# Patient Record
Sex: FEMALE | Race: BLACK OR AFRICAN AMERICAN | Employment: FULL TIME | URBAN - METROPOLITAN AREA
[De-identification: names, ages, dates, MRNs, and addresses within clinical notes are randomized per-mention and may not be internally consistent; named-entity substitution may affect disease eponyms.]

---

## 2022-03-09 RX ORDER — HYDROXYCHLOROQUINE SULFATE 200 MG/1
400 TABLET, FILM COATED ORAL EVERY EVENING
COMMUNITY
Start: 2021-12-14

## 2022-03-09 RX ORDER — HYDROCHLOROTHIAZIDE 25 MG/1
25 TABLET ORAL DAILY
COMMUNITY

## 2022-03-09 RX ORDER — BUPROPION HYDROCHLORIDE 150 MG/1
150 TABLET ORAL DAILY
COMMUNITY
Start: 2021-11-18

## 2022-03-09 RX ORDER — ACETAMINOPHEN 325 MG/1
975 TABLET ORAL EVERY 8 HOURS
COMMUNITY
Start: 2022-02-16

## 2022-03-09 RX ORDER — POLYETHYLENE GLYCOL 3350 17 G/17G
17 POWDER, FOR SOLUTION ORAL DAILY
COMMUNITY
Start: 2022-02-16 | End: 2022-03-18

## 2022-03-09 RX ORDER — IBUPROFEN 600 MG/1
600 TABLET ORAL EVERY 8 HOURS
COMMUNITY
Start: 2022-02-16

## 2022-03-09 NOTE — PROGRESS NOTES
Covid testing to be done @  If positive---Pt instructed to notify MD ASAP   If negative--pt was instructed to bring results DOP/DOSPreoperative Screening for Elective Surgery/Invasive Procedures While COVID-19 present in the community     1. Have you tested positive or have been told to self-isolate for COVID-19 like symptoms within the past 28 days?no  2. Do you currently have any of the following symptoms?no  ? Fever >100.0 F or 99.9 F in immunocompromised patients? ? New onset cough, shortness of breath or difficulty breathing? ? New onset sore throat, myalgia (muscle aches and pains), headache, loss of taste/smell or diarrhea? 3. Have you had a potential exposure to COVID-19 within the past 14 days by:no  ? Close contact with a confirmed case? ? Close contact with a healthcare worker,  or essential infrastructure worker (grocery store, [employer], gas station, public utilities or transportation)?no  ? Do you reside in a congregate setting such as; skilled nursing facility, adult home, correctional facility, homeless shelter or other institutional setting? ? Have you had recent travel to a known COVID-19 hotspot? Indicate if the patient has a positive screen by answering yes to one or more of the above questions. [address]  time____________        Surgery time____1530________  1400  Take the following medications with a sip of water:    Do not eat or drink anything after 12:00 midnight prior to your surgery. This includes water chewing gum, mints and ice chips. You may brush your teeth and gargle the morning of your surgery, but do not swallow the water     Please see your family doctor/pediatrician for a history and physical and/or concerning medications. Bring any test results/reports from your physicians office.    If you are under the care of a heart doctor or specialist doctor, please be aware that you may be asked to them for clearance    You may be asked to stop blood thinners such as Coumadin, Plavix, Fragmin, Lovenox, etc., or any anti-inflammatories such as:  Aspirin, Ibuprofen, Advil, Naproxen prior to your surgery. We also ask that you stop any OTC medications such as fish oil, vitamin E, glucosamine, garlic, Multivitamins, COQ 10, etc.    We ask that you do not smoke 24 hours prior to surgery  We ask that you do not  drink any alcoholic beverages 24 hours prior to surgery     You must make arrangements for a responsible adult to take you home after your surgery. For your safety you will not be allowed to leave alone or drive yourself home. Your surgery will be cancelled if you do not have a ride home. Also for your safety, it is strongly suggested that someone stay with you the first 24 hours after your surgery. A parent or legal guardian must accompany a child scheduled for surgery and plan to stay at the hospital until the child is discharged. Please do not bring other children with you. For your comfort, please wear simple loose fitting clothing to the hospital.  Please do not bring valuables. Do not wear any make-up or nail polish on your fingers or toes      For your safety, please do not wear any jewelry or body piercing's on the day of surgery. All jewelry must be removed. If you have dentures, they will be removed before going to operating room. For your convenience, we will provide you with a container. If you wear contact lenses or glasses, they will be removed, please bring a case for them. If you have a living will and a durable power of  for healthcare, please bring in a copy. As part of our patient safety program to minimize surgical site infections, we ask you to do the following:    · Please notify your surgeon if you develop any illness between         now and the  day of your surgery.     · This includes a cough, cold, fever, sore throat, nausea,         or vomiting, and diarrhea, etc.  ·  Please notify your surgeon if you experience dizziness, shortness         of breath or blurred vision between now and the time of your surgery. Do not shave your operative site 96 hours prior to surgery. For face and neck surgery, men may use an electric razor 48 hours   prior to surgery. You may shower the night before surgery or the morning of   your surgery with an antibacterial soap. You will need to bring a photo ID and insurance card    [facility] has an onsite pharmacy, would you like to utilize our pharmacy     If you will be staying overnight and use a C-pap machine, please bring   your C-pap to hospital     Our goal is to provide you with excellent care, therefore, visitors will be limited to two(2) in the room at a time so that we may focus on providing this care for you. Please contact pre-admission testing if you have any further questions. [facility] phone number:  [phone] PAT fax number:  [phone]  Please note these are generalized instructions for all surgical cases, you may be provided with more specific instructions according to your surgery.

## 2022-03-15 ENCOUNTER — ANESTHESIA EVENT (OUTPATIENT)
Dept: ENDOSCOPY | Age: 47
End: 2022-03-15
Payer: COMMERCIAL

## 2022-03-16 ENCOUNTER — HOSPITAL ENCOUNTER (OUTPATIENT)
Age: 47
Setting detail: OUTPATIENT SURGERY
Discharge: HOME | End: 2022-03-16
Attending: INTERNAL MEDICINE | Admitting: INTERNAL MEDICINE
Payer: COMMERCIAL

## 2022-03-16 ENCOUNTER — ANESTHESIA (OUTPATIENT)
Dept: ENDOSCOPY | Age: 47
End: 2022-03-16
Payer: COMMERCIAL

## 2022-03-16 VITALS
SYSTOLIC BLOOD PRESSURE: 143 MMHG | RESPIRATION RATE: 18 BRPM | HEART RATE: 75 BPM | WEIGHT: 258.71 LBS | OXYGEN SATURATION: 99 % | TEMPERATURE: 97.3 F | BODY MASS INDEX: 41.58 KG/M2 | HEIGHT: 66 IN | DIASTOLIC BLOOD PRESSURE: 83 MMHG

## 2022-03-16 VITALS — SYSTOLIC BLOOD PRESSURE: 103 MMHG | DIASTOLIC BLOOD PRESSURE: 50 MMHG | OXYGEN SATURATION: 100 % | TEMPERATURE: 98.6 F

## 2022-03-16 PROCEDURE — 3700000001 HC ADD 15 MINUTES (ANESTHESIA): Performed by: INTERNAL MEDICINE

## 2022-03-16 PROCEDURE — 88305 TISSUE EXAM BY PATHOLOGIST: CPT

## 2022-03-16 PROCEDURE — 88342 IMHCHEM/IMCYTCHM 1ST ANTB: CPT

## 2022-03-16 PROCEDURE — 2500000003 HC RX 250 WO HCPCS

## 2022-03-16 PROCEDURE — 2709999900 HC NON-CHARGEABLE SUPPLY: Performed by: INTERNAL MEDICINE

## 2022-03-16 PROCEDURE — 6360000002 HC RX W HCPCS

## 2022-03-16 PROCEDURE — 7100000000 HC PACU RECOVERY - FIRST 15 MIN: Performed by: INTERNAL MEDICINE

## 2022-03-16 PROCEDURE — 3700000000 HC ANESTHESIA ATTENDED CARE: Performed by: INTERNAL MEDICINE

## 2022-03-16 PROCEDURE — 88341 IMHCHEM/IMCYTCHM EA ADD ANTB: CPT

## 2022-03-16 PROCEDURE — 88172 CYTP DX EVAL FNA 1ST EA SITE: CPT

## 2022-03-16 PROCEDURE — 2580000003 HC RX 258: Performed by: ANESTHESIOLOGY

## 2022-03-16 PROCEDURE — 7100000010 HC PHASE II RECOVERY - FIRST 15 MIN: Performed by: INTERNAL MEDICINE

## 2022-03-16 PROCEDURE — 7100000001 HC PACU RECOVERY - ADDTL 15 MIN: Performed by: INTERNAL MEDICINE

## 2022-03-16 PROCEDURE — 2720000010 HC SURG SUPPLY STERILE: Performed by: INTERNAL MEDICINE

## 2022-03-16 PROCEDURE — 7100000011 HC PHASE II RECOVERY - ADDTL 15 MIN: Performed by: INTERNAL MEDICINE

## 2022-03-16 PROCEDURE — 3609020100 HC COLONOSCOPY W/EUS FNA: Performed by: INTERNAL MEDICINE

## 2022-03-16 PROCEDURE — 88173 CYTOPATH EVAL FNA REPORT: CPT

## 2022-03-16 RX ORDER — SODIUM CHLORIDE 0.9 % (FLUSH) 0.9 %
5-40 SYRINGE (ML) INJECTION EVERY 12 HOURS SCHEDULED
Status: DISCONTINUED | OUTPATIENT
Start: 2022-03-16 | End: 2022-03-16 | Stop reason: HOSPADM

## 2022-03-16 RX ORDER — SODIUM CHLORIDE 9 MG/ML
INJECTION, SOLUTION INTRAVENOUS CONTINUOUS
Status: DISCONTINUED | OUTPATIENT
Start: 2022-03-16 | End: 2022-03-16 | Stop reason: HOSPADM

## 2022-03-16 RX ORDER — SODIUM CHLORIDE 9 MG/ML
25 INJECTION, SOLUTION INTRAVENOUS PRN
Status: DISCONTINUED | OUTPATIENT
Start: 2022-03-16 | End: 2022-03-16 | Stop reason: HOSPADM

## 2022-03-16 RX ORDER — ONDANSETRON 2 MG/ML
4 INJECTION INTRAMUSCULAR; INTRAVENOUS
Status: DISCONTINUED | OUTPATIENT
Start: 2022-03-16 | End: 2022-03-16 | Stop reason: HOSPADM

## 2022-03-16 RX ORDER — PROPOFOL 10 MG/ML
INJECTION, EMULSION INTRAVENOUS PRN
Status: DISCONTINUED | OUTPATIENT
Start: 2022-03-16 | End: 2022-03-16 | Stop reason: SDUPTHER

## 2022-03-16 RX ORDER — SODIUM CHLORIDE 0.9 % (FLUSH) 0.9 %
5-40 SYRINGE (ML) INJECTION PRN
Status: DISCONTINUED | OUTPATIENT
Start: 2022-03-16 | End: 2022-03-16 | Stop reason: HOSPADM

## 2022-03-16 RX ORDER — LIDOCAINE HYDROCHLORIDE 20 MG/ML
INJECTION, SOLUTION INFILTRATION; PERINEURAL PRN
Status: DISCONTINUED | OUTPATIENT
Start: 2022-03-16 | End: 2022-03-16 | Stop reason: SDUPTHER

## 2022-03-16 RX ORDER — PROPOFOL 10 MG/ML
INJECTION, EMULSION INTRAVENOUS CONTINUOUS PRN
Status: DISCONTINUED | OUTPATIENT
Start: 2022-03-16 | End: 2022-03-16 | Stop reason: SDUPTHER

## 2022-03-16 RX ADMIN — PROPOFOL 180 MCG/KG/MIN: 10 INJECTION, EMULSION INTRAVENOUS at 14:30

## 2022-03-16 RX ADMIN — LIDOCAINE HYDROCHLORIDE 100 MG: 20 INJECTION, SOLUTION INFILTRATION; PERINEURAL at 14:30

## 2022-03-16 RX ADMIN — PROPOFOL 100 MG: 10 INJECTION, EMULSION INTRAVENOUS at 14:30

## 2022-03-16 RX ADMIN — SODIUM CHLORIDE: 9 INJECTION, SOLUTION INTRAVENOUS at 13:50

## 2022-03-16 ASSESSMENT — PULMONARY FUNCTION TESTS
PIF_VALUE: 1
PIF_VALUE: 1
PIF_VALUE: 0
PIF_VALUE: 1
PIF_VALUE: 0
PIF_VALUE: 1
PIF_VALUE: 0
PIF_VALUE: 0
PIF_VALUE: 1
PIF_VALUE: 0
PIF_VALUE: 1
PIF_VALUE: 1
PIF_VALUE: 0
PIF_VALUE: 1
PIF_VALUE: 0
PIF_VALUE: 0
PIF_VALUE: 1
PIF_VALUE: 0

## 2022-03-16 ASSESSMENT — PAIN DESCRIPTION - FREQUENCY: FREQUENCY: CONTINUOUS

## 2022-03-16 ASSESSMENT — PAIN DESCRIPTION - PROGRESSION: CLINICAL_PROGRESSION: NOT CHANGED

## 2022-03-16 ASSESSMENT — PAIN DESCRIPTION - ONSET: ONSET: GRADUAL

## 2022-03-16 ASSESSMENT — PAIN DESCRIPTION - LOCATION: LOCATION: RECTUM

## 2022-03-16 ASSESSMENT — PAIN SCALES - GENERAL
PAINLEVEL_OUTOF10: 0
PAINLEVEL_OUTOF10: 2
PAINLEVEL_OUTOF10: 0

## 2022-03-16 ASSESSMENT — PAIN DESCRIPTION - PAIN TYPE: TYPE: ACUTE PAIN

## 2022-03-16 ASSESSMENT — PAIN DESCRIPTION - DESCRIPTORS: DESCRIPTORS: SORE

## 2022-03-16 ASSESSMENT — PAIN - FUNCTIONAL ASSESSMENT
PAIN_FUNCTIONAL_ASSESSMENT: 0-10
PAIN_FUNCTIONAL_ASSESSMENT: ACTIVITIES ARE NOT PREVENTED

## 2022-03-16 NOTE — OP NOTE
Endoscopy Note    Patient: Kamlesh Alatorre  : 1975  Acct#:     Procedure: Flex sig   EUS with FNA    Date:  3/16/2022    Surgeon:  Raul Wei MD, MD    Referring Physician:  DR. Mark Cunningham    Anesthesia:  TIVA    Indications: This is a 40y.o. year old female who presents today with large rectal polyp removed with positive margin colonoscopically. Dr. Mark Cunningham then did full thickness removal of my polypectomy with negative pathology. However, MRI shows a 7 x 5 x 12mm extramural nodule abutting the wall of the rectum 6cm above the anal verge. Asked for FNA of this lesion. Procedure: An informed consent was obtained from the patient after explanation of indications, benefits, possible risks and complications of the procedure. The patient was then taken to the endoscopy suite, placed in the left lateral decubitus position, and the above IV anesthesia was administered. A digital rectal examination was performed and revealed negative without mass, lesions or tenderness. The Olympus CFQ-180-AL video colonoscope was placed in the patient's rectum under digital direction and advanced to the sigmoid colon. The prep was good. Views were good, patient toleration was good. Colon: The polypectomy scar was identified without recurrence. By EUS, there was a 1.17 x 0.56 cm hypoechoic nodule with irregular borders. Using doppler ultrasound to find a vessel-free path into the lesion, a 22 G Acquire needle was passed under ultrasound guidance directly into the lesion. 3 passes were made. Slides were made and specimen was also sent in formalin. Preliminary results were + for malignancy. The scope was straightened, the colon was decompressed and the scope was withdrawn from the patient. The patient tolerated the procedure well and was taken to Recovery in good condition. Estimated blood loss minimal  Specimens taken: yes    Impression:   1.   The nodule identified on MRI was seen on EUS and was 1.17 x 0.56 cm with irregular borders. FNA preliminarily concerning for malignancy. Recommendations:    1. Clear diet, advance as tolerated. 2.  Await final biopsy results. Call office on Monday if she has not heard from us.     Marcelle De Paz MD,   [address] [facility] [address]  3/16/2022

## 2022-03-16 NOTE — PROGRESS NOTES
Pt arouses to name. Denies pain at present. VSS. Dr. Jodie Lott to speak with pt. Pt resting on left side.

## 2022-03-16 NOTE — PROGRESS NOTES
Tolerating oral intake and being up in chair. Voided per BR. Discharge instructions, verbal and written, given to patient. Verbalize understanding. Patient requested RN not share instructions with family.

## 2022-03-16 NOTE — ANESTHESIA PRE PROCEDURE
Department of Anesthesiology  Preprocedure Note       Name:  Carter Menendez   Age:  40 y.o.  :  1975                                          MRN:  [id number]         Date:  3/16/2022      Surgeon: Val Mendez):  Nathaniel Tran MD    Procedure: Procedure(s):  ULTRASOUND, ENDOSCOPIC, RECTAL APPROACH WITH FINE NEEDLE ASPIRATION    Medications prior to admission:   Prior to Admission medications    Medication Sig Start Date End Date Taking? Authorizing Provider   acetaminophen (TYLENOL) 325 MG tablet Take 975 mg by mouth every 8 hours 22  Yes Historical Provider, MD   buPROPion (WELLBUTRIN XL) 150 MG extended release tablet Take 150 mg by mouth daily 21  Yes Historical Provider, MD   hydroxychloroquine (PLAQUENIL) 200 MG tablet Take 400 mg by mouth every evening 21  Yes Historical Provider, MD   ibuprofen (ADVIL;MOTRIN) 600 MG tablet Take 600 mg by mouth every 8 hours 22  Yes Historical Provider, MD   polyethylene glycol (GLYCOLAX) 17 GM/SCOOP powder Take 17 g by mouth daily 2/16/22 3/18/22 Yes Historical Provider, MD   hydroCHLOROthiazide (HYDRODIURIL) 25 MG tablet Take 25 mg by mouth daily    Historical Provider, MD       Current medications:    No current facility-administered medications for this encounter. Allergies:  No Known Allergies    Problem List:  There is no problem list on file for this patient.       Past Medical History:        Diagnosis Date    Breast [facility] ([facility]     right    Cancer ([facility]     colon    Hypertension     Lupus ([facility]        Past Surgical History:        Procedure Laterality Date     SECTION      times 2    COLECTOMY      COLONOSCOPY      HYSTERECTOMY      MASTECTOMY, BILATERAL         Social History:    Social History     Tobacco Use    Smoking status: Never Smoker    Smokeless tobacco: Never Used   Substance Use Topics    Alcohol use: Not Currently                                Counseling given: Not Answered      Vital Signs (Current):   Vitals:    03/09/22 1420   Weight: 250 lb (113.4 kg)   Height: 5' 5.5\" (1.664 m)                                              BP Readings from Last 3 Encounters:   No data found for BP       NPO Status:                                                                                 BMI:   Wt Readings from Last 3 Encounters:   03/09/22 250 lb (113.4 kg)     Body mass index is 40.97 kg/m². CBC: No results found for: WBC, RBC, HGB, HCT, MCV, RDW, PLT    CMP: No results found for: NA, K, CL, CO2, BUN, CREATININE, GFRAA, AGRATIO, LABGLOM, GLUCOSE, GLU, PROT, CALCIUM, BILITOT, ALKPHOS, AST, ALT    POC Tests: No results for input(s): POCGLU, POCNA, POCK, POCCL, POCBUN, POCHEMO, POCHCT in the last 72 hours. Coags: No results found for: PROTIME, INR, APTT    HCG (If Applicable): No results found for: PREGTESTUR, PREGSERUM, HCG, HCGQUANT     ABGs: No results found for: PHART, PO2ART, SDD4DGY, QOH1QGT, BEART, M0WBKZAC     Type & Screen (If Applicable):  No results found for: LABABO, LABRH    Drug/Infectious Status (If Applicable):  No results found for: HIV, HEPCAB    COVID-19 Screening (If Applicable): No results found for: COVID19        Anesthesia Evaluation  Patient summary reviewed no history of anesthetic complications:   Airway: Mallampati: II        Dental:          Pulmonary:       (-) pneumonia                           Cardiovascular:    (+) hypertension:,     (-) valvular problems/murmurs, past MI, CABG/stent, dysrhythmias and no pulmonary hypertension                Neuro/Psych:      (-) seizures and CVA           GI/Hepatic/Renal:   (+) morbid obesity     (-) no renal disease and bowel prep       Endo/Other:    (+) : arthritis:., malignancy/cancer. ROS comment: SLE Abdominal:   (+) obese,           Vascular: negative vascular ROS. Other Findings:             Anesthesia Plan      MAC     ASA 3       Induction: intravenous.       Anesthetic plan and risks discussed with patient. Plan discussed with CRNA.     Attending anesthesiologist reviewed and agrees with Preprocedure content              Angely Prince MD   3/16/2022

## 2022-03-17 NOTE — H&P
Pre-operative History and Physical    Patient: Sami Nunez  : 1975  Acct#:     HISTORY OF PRESENT ILLNESS:    The patient is a 40 y.o. female who presents with large rectal polyp removed with positive margin colonoscopically. Dr. Amie Glez then did full thickness removal of my polypectomy with negative pathology. However, MRI shows a 7 x 5 x 12mm extramural nodule abutting the wall of the rectum 6cm above the anal verge. Asked for FNA of this lesion. Past Medical History:        Diagnosis Date    Breast [facility] ([facility]     right    Cancer ([facility] [facility]     colon    Hypertension     [facility]       Past Surgical History:        Procedure Laterality Date     SECTION      times 2    COLECTOMY      COLONOSCOPY      HYSTERECTOMY      MASTECTOMY, BILATERAL        Medications Prior to Admission:   No current facility-administered medications on file prior to encounter. Current Outpatient Medications on File Prior to Encounter   Medication Sig Dispense Refill    acetaminophen (TYLENOL) 325 MG tablet Take 975 mg by mouth every 8 hours      buPROPion (WELLBUTRIN XL) 150 MG extended release tablet Take 150 mg by mouth daily      hydroxychloroquine (PLAQUENIL) 200 MG tablet Take 400 mg by mouth every evening      ibuprofen (ADVIL;MOTRIN) 600 MG tablet Take 600 mg by mouth every 8 hours      polyethylene glycol (GLYCOLAX) 17 GM/SCOOP powder Take 17 g by mouth daily      hydroCHLOROthiazide (HYDRODIURIL) 25 MG tablet Take 25 mg by mouth daily          Allergies:  Patient has no known allergies.     Social History:   Social History     Socioeconomic History    Marital status:      Spouse name: Not on file    Number of children: Not on file    Years of education: Not on file    Highest education level: Not on file   Occupational History    Not on file   Tobacco Use    Smoking status: Never Smoker    Smokeless tobacco: Never Used   Vaping Use    Vaping Use: Never used   Substance and Sexual Activity    Alcohol use: Not Currently    Drug use: Never    Sexual activity: Not on file   Other Topics Concern    Not on file   Social History Narrative    Not on file     Social Determinants of Health     Financial Resource Strain:     Difficulty of Paying Living Expenses: Not on file   Food Insecurity:     Worried About Running Out of Food in the Last Year: Not on file    Leon of Food in the Last Year: Not on file   Transportation Needs:     Lack of Transportation (Medical): Not on file    Lack of Transportation (Non-Medical): Not on file   Physical Activity:     Days of Exercise per Week: Not on file    Minutes of Exercise per Session: Not on file   Stress:     Feeling of Stress : Not on file   Social Connections:     Frequency of Communication with Friends and Family: Not on file    Frequency of Social Gatherings with Friends and Family: Not on file    Attends [employer] Services: Not on file    Active Member of [address] [place] [address] [employer] or Organizations: Not on file    Attends Club or Organization Meetings: Not on file    Marital Status: Not on file   Intimate Partner Violence:     Fear of Current or Ex-Partner: Not on file    Emotionally Abused: Not on file    Physically Abused: Not on file    Sexually Abused: Not on file   Housing Stability:     Unable to Pay for Housing in the Last Year: Not on file    Number of [place] in the Last Year: Not on file    Unstable Housing in the Last Year: Not on file      Family History:   History reviewed. No pertinent family history. PHYSICAL EXAM:      BP (!) 143/83   Pulse 75   Temp 97.3 °F (36.3 °C)   Resp 18   Ht 5' 5.5\" (1.664 m)   Wt 258 lb 11.4 oz (117.4 kg)   SpO2 99%   BMI 42.40 kg/m²  I        Heart:  RRR    Lungs:  CTA b    Abdomen:  S/NT/ND/+BS      ASSESSMENT AND PLAN:  ASA: per anesthesia  Mallampati: per anesthesia  1. Patient is a 40 y.o. female here for flex sig and EUS with FNA   2.   Procedure options, risks and benefits reviewed with the patient. The patient expresses understanding.     Justin Lema

## 2023-02-03 NOTE — PROGRESS NOTES
[address] [facility] time__0900__________        Surgery time______1030______    Take the following medications with a sip of water: Follow your MD/Surgeons pre-procedure instructions regarding your medications     Do not eat or drink anything after 12:00 midnight prior to your surgery. This includes water chewing gum, mints and ice chips. You may brush your teeth and gargle the morning of your surgery, but do not swallow the water     Please see your family doctor/pediatrician for a history and physical and/or concerning medications. Bring any test results/reports from your physicians office. If you are under the care of a heart doctor or specialist doctor, please be aware that you may be asked to them for clearance    You may be asked to stop blood thinners such as Coumadin, Plavix, Fragmin, Lovenox, etc., or any anti-inflammatories such as:  Aspirin, Ibuprofen, Advil, Naproxen prior to your surgery. We also ask that you stop any OTC medications such as fish oil, vitamin E, glucosamine, garlic, Multivitamins, COQ 10, etc. MAY TAKE TYLENOL    We ask that you do not smoke 24 hours prior to surgery  We ask that you do not  drink any alcoholic beverages 24 hours prior to surgery     You must make arrangements for a responsible adult to take you home after your surgery. For your safety you will not be allowed to leave alone or drive yourself home. Your surgery will be cancelled if you do not have a ride home. Also for your safety, it is strongly suggested that someone stay with you the first 24 hours after your surgery. A parent or legal guardian must accompany a child scheduled for surgery and plan to stay at the hospital until the child is discharged. Please do not bring other children with you. For your comfort, please wear simple loose fitting clothing to the hospital.  Please do not bring valuables.     Do not wear any make-up or nail polish on your fingers or toes      For your safety, please do not wear any jewelry or body piercing's on the day of surgery. All jewelry must be removed. If you have dentures, they will be removed before going to operating room. For your convenience, we will provide you with a container. If you wear contact lenses or glasses, they will be removed, please bring a case for them. If you have a living will and a durable power of  for healthcare, please bring in a copy. As part of our patient safety program to minimize surgical site infections, we ask you to do the following:    Please notify your surgeon if you develop any illness between         now and the  day of your surgery. This includes a cough, cold, fever, sore throat, nausea,         or vomiting, and diarrhea, etc.   Please notify your surgeon if you experience dizziness, shortness         of breath or blurred vision between now and the time of your surgery. Do not shave your operative site 96 hours prior to surgery. For face and neck surgery, men may use an electric razor 48 hours   prior to surgery. You may shower the night before surgery or the morning of   your surgery with an antibacterial soap. You will need to bring a photo ID and insurance card    [facility] has an onsite pharmacy, would you like to utilize our pharmacy     If you will be staying overnight and use a C-pap machine, please bring   your C-pap to hospital     Our goal is to provide you with excellent care, therefore, visitors will be limited to two(2) in the room at a time so that we may focus on providing this care for you. Please contact pre-admission testing if you have any further questions.                  [facility] phone number:  [phone] [facility] fax number:  [phone]  Please note these are generalized instructions for all surgical cases, you may be provided with more specific instructions according to your surgery. C-Difficile admission screening and protocol:       * Admitted with diarrhea? [] YES    [x]  NO     *Prior history of C-Diff. In last 3 months? [] YES    [x]  NO     *Antibiotic use in the past 6-8 weeks? [x]  NO    []  YES                 If yes, which ANTIBIOTIC AND REASON______     *Prior hospitalization or nursing home in the last month? []  YES    [x]  NO        SAFETY FIRST. .call before you fall

## 2023-02-14 ENCOUNTER — ANESTHESIA EVENT (OUTPATIENT)
Dept: ENDOSCOPY | Age: 48
End: 2023-02-14
Payer: COMMERCIAL

## 2023-02-15 ENCOUNTER — ANESTHESIA (OUTPATIENT)
Dept: ENDOSCOPY | Age: 48
End: 2023-02-15
Payer: COMMERCIAL

## 2023-02-15 ENCOUNTER — HOSPITAL ENCOUNTER (OUTPATIENT)
Age: 48
Setting detail: OUTPATIENT SURGERY
Discharge: HOME | End: 2023-02-15
Attending: INTERNAL MEDICINE | Admitting: INTERNAL MEDICINE
Payer: COMMERCIAL

## 2023-02-15 VITALS
TEMPERATURE: 97.1 F | OXYGEN SATURATION: 100 % | HEART RATE: 92 BPM | SYSTOLIC BLOOD PRESSURE: 120 MMHG | WEIGHT: 253.53 LBS | BODY MASS INDEX: 40.75 KG/M2 | RESPIRATION RATE: 18 BRPM | HEIGHT: 66 IN | DIASTOLIC BLOOD PRESSURE: 64 MMHG

## 2023-02-15 PROCEDURE — 88172 CYTP DX EVAL FNA 1ST EA SITE: CPT

## 2023-02-15 PROCEDURE — 2580000003 HC RX 258: Performed by: ANESTHESIOLOGY

## 2023-02-15 PROCEDURE — 88305 TISSUE EXAM BY PATHOLOGIST: CPT

## 2023-02-15 PROCEDURE — 88177 CYTP FNA EVAL EA ADDL: CPT

## 2023-02-15 PROCEDURE — 88173 CYTOPATH EVAL FNA REPORT: CPT

## 2023-02-15 PROCEDURE — 3609020100 HC COLONOSCOPY W/EUS FNA: Performed by: INTERNAL MEDICINE

## 2023-02-15 PROCEDURE — 7100000010 HC PHASE II RECOVERY - FIRST 15 MIN: Performed by: INTERNAL MEDICINE

## 2023-02-15 PROCEDURE — 2709999900 HC NON-CHARGEABLE SUPPLY: Performed by: INTERNAL MEDICINE

## 2023-02-15 PROCEDURE — 2720000010 HC SURG SUPPLY STERILE: Performed by: INTERNAL MEDICINE

## 2023-02-15 PROCEDURE — 2500000003 HC RX 250 WO HCPCS

## 2023-02-15 PROCEDURE — 7100000011 HC PHASE II RECOVERY - ADDTL 15 MIN: Performed by: INTERNAL MEDICINE

## 2023-02-15 PROCEDURE — 7100000000 HC PACU RECOVERY - FIRST 15 MIN: Performed by: INTERNAL MEDICINE

## 2023-02-15 PROCEDURE — 3700000000 HC ANESTHESIA ATTENDED CARE: Performed by: INTERNAL MEDICINE

## 2023-02-15 PROCEDURE — 7100000001 HC PACU RECOVERY - ADDTL 15 MIN: Performed by: INTERNAL MEDICINE

## 2023-02-15 PROCEDURE — 6360000002 HC RX W HCPCS

## 2023-02-15 PROCEDURE — 3700000001 HC ADD 15 MINUTES (ANESTHESIA): Performed by: INTERNAL MEDICINE

## 2023-02-15 RX ORDER — SODIUM CHLORIDE 0.9 % (FLUSH) 0.9 %
5-40 SYRINGE (ML) INJECTION EVERY 12 HOURS SCHEDULED
Status: DISCONTINUED | OUTPATIENT
Start: 2023-02-15 | End: 2023-02-15 | Stop reason: HOSPADM

## 2023-02-15 RX ORDER — SODIUM CHLORIDE 9 MG/ML
INJECTION, SOLUTION INTRAVENOUS PRN
Status: DISCONTINUED | OUTPATIENT
Start: 2023-02-15 | End: 2023-02-15 | Stop reason: HOSPADM

## 2023-02-15 RX ORDER — ONDANSETRON 2 MG/ML
4 INJECTION INTRAMUSCULAR; INTRAVENOUS
Status: DISCONTINUED | OUTPATIENT
Start: 2023-02-15 | End: 2023-02-15 | Stop reason: HOSPADM

## 2023-02-15 RX ORDER — SODIUM CHLORIDE 0.9 % (FLUSH) 0.9 %
5-40 SYRINGE (ML) INJECTION PRN
Status: DISCONTINUED | OUTPATIENT
Start: 2023-02-15 | End: 2023-02-15 | Stop reason: HOSPADM

## 2023-02-15 RX ORDER — PROPOFOL 10 MG/ML
INJECTION, EMULSION INTRAVENOUS CONTINUOUS PRN
Status: DISCONTINUED | OUTPATIENT
Start: 2023-02-15 | End: 2023-02-15 | Stop reason: SDUPTHER

## 2023-02-15 RX ORDER — DIPHENHYDRAMINE HYDROCHLORIDE 50 MG/ML
12.5 INJECTION INTRAMUSCULAR; INTRAVENOUS
Status: DISCONTINUED | OUTPATIENT
Start: 2023-02-15 | End: 2023-02-15 | Stop reason: HOSPADM

## 2023-02-15 RX ORDER — PROPOFOL 10 MG/ML
INJECTION, EMULSION INTRAVENOUS PRN
Status: DISCONTINUED | OUTPATIENT
Start: 2023-02-15 | End: 2023-02-15 | Stop reason: SDUPTHER

## 2023-02-15 RX ORDER — GLYCOPYRROLATE 0.2 MG/ML
INJECTION INTRAMUSCULAR; INTRAVENOUS PRN
Status: DISCONTINUED | OUTPATIENT
Start: 2023-02-15 | End: 2023-02-15 | Stop reason: SDUPTHER

## 2023-02-15 RX ORDER — LIDOCAINE HYDROCHLORIDE 20 MG/ML
INJECTION, SOLUTION EPIDURAL; INFILTRATION; INTRACAUDAL; PERINEURAL PRN
Status: DISCONTINUED | OUTPATIENT
Start: 2023-02-15 | End: 2023-02-15 | Stop reason: SDUPTHER

## 2023-02-15 RX ADMIN — SODIUM CHLORIDE: 9 INJECTION, SOLUTION INTRAVENOUS at 12:15

## 2023-02-15 RX ADMIN — LIDOCAINE HYDROCHLORIDE 80 MG: 20 INJECTION, SOLUTION EPIDURAL; INFILTRATION; INTRACAUDAL; PERINEURAL at 11:29

## 2023-02-15 RX ADMIN — SODIUM CHLORIDE: 9 INJECTION, SOLUTION INTRAVENOUS at 11:24

## 2023-02-15 RX ADMIN — PROPOFOL 180 MCG/KG/MIN: 10 INJECTION, EMULSION INTRAVENOUS at 11:29

## 2023-02-15 RX ADMIN — GLYCOPYRROLATE 0.2 MG: 0.2 INJECTION, SOLUTION INTRAMUSCULAR; INTRAVENOUS at 11:26

## 2023-02-15 RX ADMIN — PROPOFOL 100 MG: 10 INJECTION, EMULSION INTRAVENOUS at 11:29

## 2023-02-15 ASSESSMENT — PAIN SCALES - GENERAL
PAINLEVEL_OUTOF10: 0
PAINLEVEL_OUTOF10: 0

## 2023-02-15 NOTE — PROGRESS NOTES
500 ml bolus completed. Alert. No complaints. Awaiting discharge order and instructions. Brother at bedside.

## 2023-02-15 NOTE — ANESTHESIA POSTPROCEDURE EVALUATION
Department of Anesthesiology  Postprocedure Note    Patient: Shaq Denise  MRN: [id number]  YOB: 1975  Date of evaluation: 2/15/2023      Procedure Summary     Date: 02/15/23 Room / Location: [address] [place] [address]    Anesthesia Start: 3509 Anesthesia Stop: 1226    Procedure: COLONOSCOPY W/EUS AND FNA Diagnosis:       Rectal cancer ([address]      (RECTAL CANCER)    Surgeons: Nidia Baez MD Responsible Provider: Ketan Summers MD    Anesthesia Type: MAC ASA Status: 3          Anesthesia Type: No value filed. Smita Phase I: Smita Score: 10    Smita Phase II: Smita Score: 9      Anesthesia Post Evaluation    Patient location during evaluation: bedside  Patient participation: complete - patient participated  Level of consciousness: awake and alert  Pain score: 0  Nausea & Vomiting: no nausea  Complications: no  Cardiovascular status: hemodynamically stable  Respiratory status: acceptable  Hydration status: stable  Comments: Became hypotensive in phase 2, responded well to fluid bolus. No chest pain or dyspnea throughout.

## 2023-02-15 NOTE — PROGRESS NOTES
Tolerating being up in chair and oral intake. Discharge instructions given to patient and son. Verbalize understanding. No complaints. IV discontinued from left forearm, intact. No bleeding or swelling noted. Stable for discharge.

## 2023-02-15 NOTE — H&P
Pre-operative History and Physical    Patient: Jamaal Kingsley  : 1975  Acct#:     HISTORY OF PRESENT ILLNESS:    The patient is a 56 y.o. female who presents with large rectal polyp removed with positive margin colonoscopically. Dr. Torin Francois then did full thickness removal of my polypectomy with negative pathology. However, MRI showed a 7 x 5 x 12mm extramural nodule abutting the wall of the rectum 6cm above the anal verge. FNA confirmed adenocarcinoma. This lesion was treated and is still present on MRI. Asked for surveillance colonoscopy and repeat FNA of the lesion. Past Medical History:        Diagnosis Date    Breast cancer ([facility]     right    Cancer ([facility] [address]     colon    Hypertension     NO MEDS    Lupus ([facility] [address]       Past Surgical History:        Procedure Laterality Date    BREAST RECONSTRUCTION       SECTION      times 2    COLECTOMY      COLONOSCOPY      COLONOSCOPY N/A 2022    ULTRASOUND, ENDOSCOPIC, RECTAL APPROACH WITH FINE NEEDLE ASPIRATION performed by Lizett Grover MD at [address] [place] [address] (CERVIX STATUS UNKNOWN)      MASTECTOMY, BILATERAL      TUBAL LIGATION        Medications Prior to Admission:   No current facility-administered medications on file prior to encounter. Current Outpatient Medications on File Prior to Encounter   Medication Sig Dispense Refill    gabapentin (NEURONTIN) 600 MG tablet Take 600 mg by mouth 3 times daily. acetaminophen (TYLENOL) 325 MG tablet Take 975 mg by mouth as needed      ibuprofen (ADVIL;MOTRIN) 600 MG tablet Take 600 mg by mouth every 8 hours          Allergies:  Patient has no known allergies.     Social History:   Social History     Socioeconomic History    Marital status:      Spouse name: Not on file    Number of children: Not on file    Years of education: Not on file    Highest education level: Not on file   Occupational History    Not on file   Tobacco Use    Smoking status: Never    Smokeless tobacco: Never   Vaping Use    Vaping Use: Never used   Substance and Sexual Activity    Alcohol use: Not Currently    Drug use: Never    Sexual activity: Not Currently   Other Topics Concern    Not on file   Social History Narrative    Not on file     Social Determinants of Health     Financial Resource Strain: Not on file   Food Insecurity: Not on file   Transportation Needs: Not on file   Physical Activity: Not on file   Stress: Not on file   Social Connections: Not on file   Intimate Partner Violence: Not on file   Housing Stability: Not on file      Family History:       Problem Relation Age of Onset    High Cholesterol Mother     Other Mother         alzheimers    Alzheimer's Disease Mother     Colon Cancer Father     Cancer Father         bone    Breast Cancer Sister         PHYSICAL EXAM:      BP (!) 133/90   Pulse 94   Temp 97.1 °F (36.2 °C) (Temporal)   Resp 18   Ht 5' 5.5\" (1.664 m)   Wt 253 lb 8.5 oz (115 kg)   SpO2 96%   BMI 41.55 kg/m²  I        Heart:  RRR    Lungs:  CTA b    Abdomen:  S/NT/ND/+BS      ASSESSMENT AND PLAN:  ASA: per anesthesia  Mallampati: per anesthesia  1. Patient is a 56 y.o. female here for colonoscopy and rectal EUS with FNA   2. Procedure options, risks and benefits reviewed with the patient. The patient expresses understanding.     Justin Lema

## 2023-02-15 NOTE — DISCHARGE INSTRUCTIONS
Impression:   Radiation changes in rectum and prior transanal excision scar without recurrent tumor. No colon polyps. The previously seen omental deposit was again identified. FNA performed. Preliminarily rare atypical cells only. Recommendations:   1. Clear liquid diet, advance as tolerated. 2.  Repeat colonoscopy in 3 years. 3.  Call on Monday for biopsy results if you have not heard. 4.  Follow up with Dr. Krupa Alvarez MD,   [facility]  2/14/2023    Discharge Instructions for Colonoscopy     Colonoscopy is a visual exam of the lining of the large intestine, also called the bowel or colon, with a colonoscope. A colonoscope is a flexible tube with a light and a viewing device. It allows the doctor to view the inside of the colon through a tiny video camera. Colonoscopy is performed for many reasons: unexplained anemia , pain, diarrhea , bloody stools, cancer screening, among many other reasons. Complications from a colonoscopy are rare. Some possible serious complications include perforated bowel (which might require surgery) and bleeding (which could require blood transfusion ). Minor complications include bloating, gas, and cramping that can last for 1-2 days after the procedure. Because air is put into your colon during the procedure, it is normal to pass large amounts of air from your rectum. You may not have a bowel movement for 1-3 days after the procedure. What You Will Need:  Someone to drive you home after the procedure     Steps to Take:  [address] when you get home. Because the sedative will make you drowsy, don't drive, operate machinery, or make important decisions the day of the procedure. Feelings of bloating, gas, or cramping may persist for 24 hours. Diet -  Try sips of water first. If tolerated, resume bland food (scrambled eggs, toast, soup) first.  If tolerated, resume regular diet or the diet recommended by your physician.    Do not drink alcohol for 24 hours. Physical Activity -  Ask your doctor when you will be able to return to work. Do not drive, operate heavy machinery, or do activities that require coordination or balance for 24 hours. Otherwise, return to your normal routine as soon as you are comfortable to do so, which is usually the next day after the procedure. Medications - When taking medications, it's important to: Take your medication as directed, not more, not less, not at a different time. Do not stop taking them without consulting your healthcare provider. Don't share them with anyone else. Know what effects and side effects to expect, and report them to your healthcare provider. If you are taking more than one drug, even if it is an over-the-counter medication, herb, or dietary supplement, be sure to check with a physician or pharmacist about drug interactions. Plan ahead for refills so you don't run out. Lifestyle Changes - The results of your colonoscopy will determine if any lifestyle changes are necessary. Follow-up:  The doctor will usually give you a preliminary report after the medication wears off and you are more alert. The results from a biopsy can take as long as 1-2 weeks to be completed. Schedule a follow-up appointment as directed by your doctor. You should schedule a follow-up colonoscopy as recommended by your doctor. Call Your Doctor If Any of the Following Occurs:  Bleeding from your rectum; notify your doctor if you pass a teaspoonful or more of blood   Black, tarry stools   Severe abdominal pain   Hard, swollen abdomen   Signs of infection, including fever or chills   Inability to pass gas or stool   Coughing, shortness of breath, chest pain, severe nausea or vomiting     In case of an emergency, call 911 immediately.

## 2023-02-15 NOTE — PROGRESS NOTES
Procedure Performed: ENDOSCOPIC ULTRASOUND With: Fine Needle Aspiration    Fine Needle Aspiration of:   Omental deposit x5          ALL FNA SPECIMENS MANAGED BY CYTOTECHNOLOGIST.         EUS scope balloon removed intact and verified by Princess Luis VALLES and Sushila VALLES    Electronically signed by Bety Galarza RN on 2/15/2023 at 12:18 PM

## 2023-02-15 NOTE — LETTER
[facility]  [address] [facility] [address]  Phone: [phone]  Fax: [phone]    Anibal Domingo MD        February 15, 2023         Please excuse Isma Fely from Work today.   He was the designated  and support person for his mother, who had a procedure involving anesthesia today      Sincerely,        Dr. Shanti Bass., RN

## 2023-02-15 NOTE — PROGRESS NOTES
Patient arrived to phase two. Alert and oriented X4. Patient drowsy at this time. Up to chair. BP dropped when up to chair, MD Lino Hidalgo called and to room. Bolus being given and legs elevated. Patient tolerating well. BP back up. Brother at bedside. Patient tolerating PO intake. Will continue to monitor.

## 2023-02-15 NOTE — OP NOTE
Endoscopy Note    Patient: Maria M Burleson  : 1975  Acct#:     Procedure: Colonoscopy with intubation of the terminal ileum  EUS with FNA    Date:  2023    Surgeon:  Meeta Cummins MD, MD    Referring Physician:  Dr. Wilfred Tafoya and Alba Ortiz APRN-CNP    Anesthesia:  TIVA    Indications: This is a 56y.o. year old female who presents today with  large rectal polyp removed with positive margin colonoscopically. Dr. Wilfrde Tafoya then did full thickness removal of my polypectomy with negative pathology. However, MRI showed a 7 x 5 x 12mm extramural nodule abutting the wall of the rectum 6cm above the anal verge. FNA confirmed adenocarcinoma. This lesion was treated and is still present on MRI. Asked for surveillance colonoscopy and repeat FNA of the lesion. Procedure: An informed consent was obtained from the patient after explanation of indications, benefits, possible risks and complications of the procedure. The patient was then taken to the endoscopy suite, placed in the left lateral decubitus position, and the above IV anesthesia was administered. A digital rectal examination was performed and revealed negative without mass, lesions or tenderness. The Olympus pediatric video colonoscope was placed in the patient's rectum under digital direction and advanced to the cecum. The cecum was identified by characteristic anatomy and ballottment. The prep was good. The ileocecal valve was identified and intubated. The ascending colon was examined twice to assure no sessile polyps missed. The scope was then withdrawn back through the cecum, ascending, transverse, descending and sigmoid colons. Carefull circumferential examination of the mucosa in these areas was performed. The scope was then withdrawn into the rectum and retroflexed. The scope was straightened, the colon was decompressed and the scope was withdrawn from the patient. Findings:  1. Normal Ileum  2. Normal colon.   No polyps or mass lesions. The scar in the rectum was identified and showed no recurrent mass. There were radiation changes in the rectum that were non-bleeding. 3.  Small grade 1 internal hemorrhoids. Next, the curvilinear array echoendoscope was passed into the rectum and advanced to the sigmoid colon. The iliac vessels were normal.  No perirectal adenopathy. The hypoechoic omental deposit was identified and measured 7.9 x 4.0mm. Using doppler ultrasound to find a vessel-free path into the lesion, a 22 G Acquire needle was passed under ultrasound guidance directly into the lesion. 5 passes were made. Slides were made and specimen was also sent in formalin. Preliminary results showed rare atypical cells. The patient tolerated the procedure well and was taken to Recovery in good condition. No complications. EBL: minimal  Specimens taken: yes      Impression:   Radiation changes in rectum and prior transanal excision scar without recurrent tumor. No colon polyps. The previously seen omental deposit was again identified. FNA performed. Preliminarily rare atypical cells only. Recommendations:   1. Clear liquid diet, advance as tolerated. 2.  Repeat colonoscopy in 3 years. 3.  Call on Monday for biopsy results if you have not heard.   4.  Follow up with Dr. Robles Murphy MD,   Alberto Gutierrez  2/14/2023

## 2023-02-15 NOTE — ANESTHESIA PRE PROCEDURE
Department of Anesthesiology  Preprocedure Note       Name:  Sheryl Boucher   Age:  56 y.o.  :  1975                                          MRN:  [id number]         Date:  2/15/2023      Surgeon: Julio Alas):  Harpreet Parks MD    Procedure: Procedure(s):  ULTRASOUND, ENDOSCOPIC, RECTAL APPROACH    Medications prior to admission:   Prior to Admission medications    Medication Sig Start Date End Date Taking? Authorizing Provider   gabapentin (NEURONTIN) 600 MG tablet Take 600 mg by mouth 3 times daily. Historical Provider, MD   acetaminophen (TYLENOL) 325 MG tablet Take 975 mg by mouth as needed 22   Historical Provider, MD   ibuprofen (ADVIL;MOTRIN) 600 MG tablet Take 600 mg by mouth every 8 hours 22   Historical Provider, MD       Current medications:    No current outpatient medications on file. No current facility-administered medications for this visit. Allergies:  No Known Allergies    Problem List:  There is no problem list on file for this patient. Past Medical History:        Diagnosis Date    Breast cancer ([address]     right    Cancer ([address]     colon    Hypertension     NO MEDS    Lupus ([facility] [address]        Past Surgical History:        Procedure Laterality Date    BREAST RECONSTRUCTION       SECTION      times 2    COLECTOMY      COLONOSCOPY      COLONOSCOPY N/A 2022    ULTRASOUND, ENDOSCOPIC, RECTAL APPROACH WITH FINE NEEDLE ASPIRATION performed by Harpreet Parks MD at [facility] (CERVIX STATUS UNKNOWN)      MASTECTOMY, BILATERAL      TUBAL LIGATION         Social History:    Social History     Tobacco Use    Smoking status: Never    Smokeless tobacco: Never   Substance Use Topics    Alcohol use: Not Currently                                Counseling given: Not Answered      Vital Signs (Current): There were no vitals filed for this visit.                                            BP Readings from Last 3 Encounters: 02/15/23 (!) 133/90   03/16/22 (!) 103/50   03/16/22 (!) 143/83       NPO Status:                                                                                 BMI:   Wt Readings from Last 3 Encounters:   02/15/23 253 lb 8.5 oz (115 kg)   03/16/22 258 lb 11.4 oz (117.4 kg)     There is no height or weight on file to calculate BMI.    CBC: No results found for: WBC, RBC, HGB, HCT, MCV, RDW, PLT    CMP: No results found for: NA, K, CL, CO2, BUN, CREATININE, GFRAA, AGRATIO, LABGLOM, GLUCOSE, GLU, PROT, CALCIUM, BILITOT, ALKPHOS, AST, ALT    POC Tests: No results for input(s): POCGLU, POCNA, POCK, POCCL, POCBUN, POCHEMO, POCHCT in the last 72 hours. Coags: No results found for: PROTIME, INR, APTT    HCG (If Applicable): No results found for: PREGTESTUR, PREGSERUM, HCG, HCGQUANT     ABGs: No results found for: PHART, PO2ART, KRS7FVX, ORT4NOA, BEART, Q5OKKLYI     Type & Screen (If Applicable):  No results found for: LABABO, LABRH    Drug/Infectious Status (If Applicable):  No results found for: HIV, HEPCAB    COVID-19 Screening (If Applicable): No results found for: COVID19        Anesthesia Evaluation  Patient summary reviewed no history of anesthetic complications:   Airway: Mallampati: II          Dental:          Pulmonary:       (-) pneumonia                           Cardiovascular:    (+) hypertension:,     (-) valvular problems/murmurs, past MI, CABG/stent, dysrhythmias and no pulmonary hypertension                Neuro/Psych:      (-) seizures and CVA           GI/Hepatic/Renal:   (+) morbid obesity     (-) no renal disease and bowel prep       Endo/Other:    (+) : arthritis:., malignancy/cancer. ROS comment: SLE Abdominal:   (+) obese,           Vascular: negative vascular ROS. Other Findings:             Anesthesia Plan      MAC     ASA 3       Induction: intravenous. Anesthetic plan and risks discussed with patient. Plan discussed with CRNA.     Attending anesthesiologist reviewed and agrees with Preprocedure content            This pre-anesthesia assessment may be used as a history and physical.    DOS STAFF ADDENDUM:    Pt seen and examined, chart reviewed (including anesthesia, drug and allergy history). No interval changes to history and physical examination. Anesthetic plan, risks, benefits, alternatives, and personnel involved discussed with patient. Patient verbalized an understanding and agrees to proceed.       Melissa Kulkarni MD  February 15, 2023  9:57 AM

## 2023-11-17 NOTE — PROGRESS NOTES
[address]  time___6_____        Surgery time__730__________    Take the following medications with a sip of water: Follow your MD/Surgeons pre-procedure instructions regarding your medications     Do not eat or drink anything after 12:00 midnight prior to your surgery. This includes water chewing gum, mints and ice chips. You may brush your teeth and gargle the morning of your surgery, but do not swallow the water     Please see your family doctor/pediatrician for a history and physical and/or concerning medications. Bring any test results/reports from your physicians office. If you are under the care of a heart doctor or specialist doctor, please be aware that you may be asked to them for clearance    You may be asked to stop blood thinners such as Coumadin, Plavix, Fragmin, Lovenox, etc., or any anti-inflammatories such as:  Aspirin, Ibuprofen, Advil, Naproxen prior to your surgery. We also ask that you stop any OTC medications such as fish oil, vitamin E, glucosamine, garlic, Multivitamins, COQ 10, etc.    We ask that you do not smoke 24 hours prior to surgery  We ask that you do not  drink any alcoholic beverages 24 hours prior to surgery     You must make arrangements for a responsible adult to take you home after your surgery. For your safety you will not be allowed to leave alone or drive yourself home. Your surgery will be cancelled if you do not have a ride home. Also for your safety, it is strongly suggested that someone stay with you the first 24 hours after your surgery. A parent or legal guardian must accompany a child scheduled for surgery and plan to stay at the hospital until the child is discharged. Please do not bring other children with you. For your comfort, please wear simple loose fitting clothing to the hospital.  Please do not bring valuables.     Do not wear any make-up or nail polish on your fingers or toes

## 2023-11-17 NOTE — PROGRESS NOTES
[facility] Pre-Admission Testing Electronic Communication Worksheet for OR/ENDO Procedures        Patient: Arden Pastor    DOS: 12/6    Arrival Time: 6    Surgery Time:730    Meds to Bed:  [x] YES    []  NO    Transportation Confirmed: [x] YES    []  NO    History and Physical:  [] YES    []  NO  [x] N/A  If yes, please list doctor or Urgent Care and date of H&P:     Additional Clearance(Cardiac, Pulmonary, etc):  [] YES    [x]  NO    Pre-Admission Testing Visit:  [] YES    [x]  NO If no, do labs/testing need to be done DOS?   [] YES    [x]  NO    Medication Reconciliation Complete:  [x] YES    []  NO        Additional Notes:                Interview Complete: [x] YES    []  NO          Dotty Callahan RN  4:47 PM

## 2023-12-04 ENCOUNTER — ANESTHESIA EVENT (OUTPATIENT)
Dept: ENDOSCOPY | Age: 48
End: 2023-12-04
Payer: COMMERCIAL

## 2023-12-04 NOTE — OP NOTE
Endoscopy Note    Patient: Nela Lott  : 1975  Acct#:     Procedure: Flex sig     EUS with FNA    Date:  2023    Surgeon:  Patricia Salaamnca MD    Referring Physician:  Dr. Manolo Moore    Anesthesia:  TIVA    Indications: This is a 53y.o. year old female who presents today with  perirectal mass for EUS with FNA. Exam with Dr. Manolo Moore showed subtle extrinsic mass effect that is concerning for local recurrence in the right posterolateral tail of the mesorectum. The overlying mucosa is somewhat friable as well on proctoscopy with Dr. Manolo Moore. She has a history of a  large rectal polyp removed with positive margin colonoscopically. Dr. Manolo Moore then did full thickness removal of my polypectomy with negative pathology. However, MRI showed a 7 x 5 x 12mm extramural nodule abutting the wall of the rectum 6cm above the anal verge. FNA confirmed adenocarcinoma. This lesion was treated with chemoradiation but was still present on MRI so FNA performed 2023 and showed atypical cells only. Planning repeat FNA given examination/proctoscopy findings with Dr. Manolo Moore. MRI shows Increased size of the tumor deposit in the right mesorectal fat at the lower rectum level, with contact of the right levator ani muscle. Diffusion restriction is now present, suspicious for recurrent tumor. A mucosal or submucosal component of the lesion is not visualized. Previous Colonoscopy: YES  Date:    Greater than 3 years: NO      Procedure: An informed consent was obtained from the patient after explanation of indications, benefits, possible risks and complications of the procedure. The patient was then taken to the endoscopy suite, placed in the left lateral decubitus position, and the above IV anesthesia was administered. A digital rectal examination was performed and revealed negative without mass, lesions or tenderness.       The Olympus upper endoscope followed by the curvilinear array echoendoscope

## 2023-12-06 ENCOUNTER — ANESTHESIA (OUTPATIENT)
Dept: ENDOSCOPY | Age: 48
End: 2023-12-06
Payer: COMMERCIAL

## 2023-12-06 ENCOUNTER — HOSPITAL ENCOUNTER (OUTPATIENT)
Age: 48
Setting detail: OUTPATIENT SURGERY
Discharge: HOME | End: 2023-12-06
Attending: INTERNAL MEDICINE | Admitting: INTERNAL MEDICINE
Payer: COMMERCIAL

## 2023-12-06 VITALS
HEART RATE: 83 BPM | DIASTOLIC BLOOD PRESSURE: 66 MMHG | RESPIRATION RATE: 15 BRPM | BODY MASS INDEX: 42.15 KG/M2 | WEIGHT: 253 LBS | OXYGEN SATURATION: 100 % | SYSTOLIC BLOOD PRESSURE: 139 MMHG | HEIGHT: 65 IN | TEMPERATURE: 97 F

## 2023-12-06 PROCEDURE — 2720000010 HC SURG SUPPLY STERILE: Performed by: INTERNAL MEDICINE

## 2023-12-06 PROCEDURE — 3700000000 HC ANESTHESIA ATTENDED CARE: Performed by: INTERNAL MEDICINE

## 2023-12-06 PROCEDURE — 3609018500 HC EGD US SCOPE W/ADJACENT STRUCTURES: Performed by: INTERNAL MEDICINE

## 2023-12-06 PROCEDURE — 88305 TISSUE EXAM BY PATHOLOGIST: CPT

## 2023-12-06 PROCEDURE — 2709999900 HC NON-CHARGEABLE SUPPLY: Performed by: INTERNAL MEDICINE

## 2023-12-06 PROCEDURE — 2500000003 HC RX 250 WO HCPCS

## 2023-12-06 PROCEDURE — 88342 IMHCHEM/IMCYTCHM 1ST ANTB: CPT

## 2023-12-06 PROCEDURE — 7100000010 HC PHASE II RECOVERY - FIRST 15 MIN: Performed by: INTERNAL MEDICINE

## 2023-12-06 PROCEDURE — 6360000002 HC RX W HCPCS

## 2023-12-06 PROCEDURE — 7100000000 HC PACU RECOVERY - FIRST 15 MIN: Performed by: INTERNAL MEDICINE

## 2023-12-06 PROCEDURE — 3700000001 HC ADD 15 MINUTES (ANESTHESIA): Performed by: INTERNAL MEDICINE

## 2023-12-06 PROCEDURE — 88173 CYTOPATH EVAL FNA REPORT: CPT

## 2023-12-06 PROCEDURE — 88172 CYTP DX EVAL FNA 1ST EA SITE: CPT

## 2023-12-06 PROCEDURE — 7100000011 HC PHASE II RECOVERY - ADDTL 15 MIN: Performed by: INTERNAL MEDICINE

## 2023-12-06 PROCEDURE — 88341 IMHCHEM/IMCYTCHM EA ADD ANTB: CPT

## 2023-12-06 PROCEDURE — 2580000003 HC RX 258: Performed by: STUDENT IN AN ORGANIZED HEALTH CARE EDUCATION/TRAINING PROGRAM

## 2023-12-06 RX ORDER — SODIUM CHLORIDE 0.9 % (FLUSH) 0.9 %
5-40 SYRINGE (ML) INJECTION EVERY 12 HOURS SCHEDULED
Status: DISCONTINUED | OUTPATIENT
Start: 2023-12-06 | End: 2023-12-06 | Stop reason: HOSPADM

## 2023-12-06 RX ORDER — GLYCOPYRROLATE 0.2 MG/ML
INJECTION INTRAMUSCULAR; INTRAVENOUS PRN
Status: DISCONTINUED | OUTPATIENT
Start: 2023-12-06 | End: 2023-12-06 | Stop reason: SDUPTHER

## 2023-12-06 RX ORDER — SODIUM CHLORIDE 0.9 % (FLUSH) 0.9 %
5-40 SYRINGE (ML) INJECTION PRN
Status: DISCONTINUED | OUTPATIENT
Start: 2023-12-06 | End: 2023-12-06 | Stop reason: HOSPADM

## 2023-12-06 RX ORDER — SODIUM CHLORIDE 9 MG/ML
INJECTION, SOLUTION INTRAVENOUS PRN
Status: DISCONTINUED | OUTPATIENT
Start: 2023-12-06 | End: 2023-12-06 | Stop reason: HOSPADM

## 2023-12-06 RX ORDER — OXYCODONE HYDROCHLORIDE 5 MG/1
5 TABLET ORAL PRN
Status: DISCONTINUED | OUTPATIENT
Start: 2023-12-06 | End: 2023-12-06 | Stop reason: HOSPADM

## 2023-12-06 RX ORDER — PROPOFOL 10 MG/ML
INJECTION, EMULSION INTRAVENOUS PRN
Status: DISCONTINUED | OUTPATIENT
Start: 2023-12-06 | End: 2023-12-06 | Stop reason: SDUPTHER

## 2023-12-06 RX ORDER — OXYCODONE HYDROCHLORIDE 10 MG/1
10 TABLET ORAL PRN
Status: DISCONTINUED | OUTPATIENT
Start: 2023-12-06 | End: 2023-12-06 | Stop reason: HOSPADM

## 2023-12-06 RX ORDER — PROPOFOL 10 MG/ML
INJECTION, EMULSION INTRAVENOUS CONTINUOUS PRN
Status: DISCONTINUED | OUTPATIENT
Start: 2023-12-06 | End: 2023-12-06 | Stop reason: SDUPTHER

## 2023-12-06 RX ORDER — ONDANSETRON 2 MG/ML
4 INJECTION INTRAMUSCULAR; INTRAVENOUS
Status: DISCONTINUED | OUTPATIENT
Start: 2023-12-06 | End: 2023-12-06 | Stop reason: HOSPADM

## 2023-12-06 RX ADMIN — PROPOFOL 80 MG: 10 INJECTION, EMULSION INTRAVENOUS at 07:37

## 2023-12-06 RX ADMIN — PROPOFOL 140 MCG/KG/MIN: 10 INJECTION, EMULSION INTRAVENOUS at 07:37

## 2023-12-06 RX ADMIN — GLYCOPYRROLATE 0.2 MG: 0.2 INJECTION INTRAMUSCULAR; INTRAVENOUS at 07:29

## 2023-12-06 RX ADMIN — SODIUM CHLORIDE: 9 INJECTION, SOLUTION INTRAVENOUS at 07:05

## 2023-12-06 ASSESSMENT — PAIN - FUNCTIONAL ASSESSMENT: PAIN_FUNCTIONAL_ASSESSMENT: NONE - DENIES PAIN

## 2023-12-06 NOTE — DISCHARGE INSTRUCTIONS
Impression:   Probable local tumor recurrence based on preliminary pathology. Will await final pathology    Recommendations:   1. Clear liquid diet, advance as tolerated. 2.  Follow-up with Dr. Natali Garcia. 3.  Please call on Monday if you have not heard from us about the biopsy results. Jaylon Sousa MD,   Brianna Evansanson  12/4/2023    Discharge Instructions for Colonoscopy     Complications from a flex sig and lower EUS are rare. Some possible serious complications include perforated bowel (which might require surgery) and bleeding (which could require blood transfusion ). Minor complications include bloating, gas, and cramping that can last for 1-2 days after the procedure. Because air is put into your colon during the procedure, it is normal to pass large amounts of air from your rectum. You may not have a bowel movement for 1-3 days after the procedure. What You Will Need:  Someone to drive you home after the procedure     Steps to Take:  [address] when you get home. Because the sedative will make you drowsy, don't drive, operate machinery, or make important decisions the day of the procedure. Feelings of bloating, gas, or cramping may persist for 24 hours. Diet -  Try sips of water first. If tolerated, resume bland food (scrambled eggs, toast, soup) first.  If tolerated, resume regular diet or the diet recommended by your physician. Do not drink alcohol for 24 hours. Physical Activity -  Ask your doctor when you will be able to return to work. Do not drive, operate heavy machinery, or do activities that require coordination or balance for 24 hours. Otherwise, return to your normal routine as soon as you are comfortable to do so, which is usually the next day after the procedure. Medications - When taking medications, it's important to: Take your medication as directed, not more, not less, not at a different time.    Do not stop taking them without consulting your

## 2023-12-06 NOTE — PROGRESS NOTES
1338 pt arrived from PACU alert and oriented, vitals stable on room air. Pt denies pain. Up to bathroom with assistance. Pt up in chair with PO snacks.

## 2023-12-06 NOTE — H&P
Pre-operative History and Physical    Patient: Lino Carlos  : 1975  Acct#:     HISTORY OF PRESENT ILLNESS:    The patient is a 53 y.o. female who presents with Subtle extrinsic mass effect that is concerning for local recurrence in the right posterolateral tail of the mesorectum. The overlying mucosa is somewhat friable as well on proctoscopy with Dr. Maxime Tellez. She has a history of a  large rectal polyp removed with positive margin colonoscopically. Dr. Maxime Tellez then did full thickness removal of my polypectomy with negative pathology. However, MRI showed a 7 x 5 x 12mm extramural nodule abutting the wall of the rectum 6cm above the anal verge. FNA confirmed adenocarcinoma. This lesion was treated with chemoradiation but was still present on MRI so FNA performed 2023 and showed atypical cells only. Planning repeat FNA given examination/proctoscopy findings with Dr. Maxime Tellez. MRI shows Increased size of the tumor deposit in the right mesorectal fat at the lower rectum level, with contact of the right levator ani muscle. Diffusion restriction is now present, suspicious for recurrent tumor. A mucosal or submucosal component of the lesion is not visualized.      Past Medical History:        Diagnosis Date    Breast cancer ([address])     right    Cancer ([address])     colon    Hypertension     NO MEDS    Lupus ([address])       Past Surgical History:        Procedure Laterality Date    BREAST RECONSTRUCTION       SECTION      times 2    COLECTOMY      COLONOSCOPY      COLONOSCOPY N/A 2022    ULTRASOUND, ENDOSCOPIC, RECTAL APPROACH WITH FINE NEEDLE ASPIRATION performed by Rebecca Fernandez MD at [facility] N/A 2/15/2023    COLONOSCOPY W/EUS AND FNA performed by Rebecca Fernandez MD at [address] [place] [address] (CERVIX STATUS UNKNOWN)      MASTECTOMY, BILATERAL      TUBAL LIGATION        Medications Prior to Admission:   No current facility-administered medications on file

## 2023-12-06 NOTE — PROGRESS NOTES
Procedure Performed: ENDOSCOPIC ULTRASOUND With: Fine Needle Aspiration    Fine Needle Aspiration of:   Mesorectal deposit x4          ALL FNA SPECIMENS MANAGED BY CYTOTECHNOLOGIST.         EUS scope balloon removed intact and verified by Traci Newsome and Sushila VALLES    Electronically signed by Bryce Barajas RN on 12/6/2023 at 8:06 AM

## 2023-12-06 NOTE — ANESTHESIA PRE PROCEDURE
dysrhythmias and no pulmonary hypertension                Neuro/Psych:      (-) seizures and CVA           GI/Hepatic/Renal:   (+) morbid obesity     (-) no renal disease and bowel prep       Endo/Other:    (+) : arthritis:., malignancy/cancer. ROS comment: SLE Abdominal:             Vascular: negative vascular ROS. Other Findings:           Anesthesia Plan      MAC     ASA 3     (I discussed intravenous sedation to the patient's satisfaction including risks and alternatives. The patient agreed with the plan and has no further questions. Doreen Cárdenas MD )  Induction: intravenous. Anesthetic plan and risks discussed with patient. Plan discussed with CRNA. Attending anesthesiologist reviewed and agrees with Preprocedure content          This pre-anesthesia assessment may be used as a history and physical.    DOS STAFF ADDENDUM:    Pt seen and examined, chart reviewed (including anesthesia, drug and allergy history). No interval changes to history and physical examination. Anesthetic plan, risks, benefits, alternatives, and personnel involved discussed with patient. Patient verbalized an understanding and agrees to proceed.       Doreen Cárdenas MD  December 6, 2023  6:38 AM

## 2023-12-06 NOTE — PROGRESS NOTES
Vitals unchanged. Pt states she feels more awake, ready to go. PO snacks went well. Brother at bedside for discharge instructions. No further questions.

## (undated) DEVICE — ENDOSCOPIC ULTRASOUND FINE NEEDLE BIOPSY (FNB) DEVICE: Brand: ACQUIRE

## (undated) DEVICE — ENDOSCOPY KIT: Brand: MEDLINE INDUSTRIES, INC.

## (undated) DEVICE — Device

## (undated) DEVICE — BITE BLOCK ENDOSCP AD 60 FR W/ ADJ STRP PLAS GRN BLOX